# Patient Record
Sex: FEMALE
[De-identification: names, ages, dates, MRNs, and addresses within clinical notes are randomized per-mention and may not be internally consistent; named-entity substitution may affect disease eponyms.]

---

## 2022-12-31 ENCOUNTER — NURSE TRIAGE (OUTPATIENT)
Dept: OTHER | Facility: CLINIC | Age: 62
End: 2022-12-31

## 2023-01-01 NOTE — TELEPHONE ENCOUNTER
Patient is calling because she has trigeminal neuralgia and she is having some break through pain and wants to know if she can take an extra dose of gabapentin. Instructed patient to contact PCP who does have an on call to speak with the about the extra dose of medication. Patient verbalized understanding. Did ask caller to call us back if she has any worsening pain or if provider doesn't respond.     Reason for Disposition   [1] Caller has URGENT medicine question about med that PCP or specialist prescribed AND [2] triager unable to answer question    Protocols used: Medication Question Call-ADULT-